# Patient Record
Sex: MALE | Race: WHITE | Employment: PART TIME | URBAN - METROPOLITAN AREA
[De-identification: names, ages, dates, MRNs, and addresses within clinical notes are randomized per-mention and may not be internally consistent; named-entity substitution may affect disease eponyms.]

---

## 2018-08-04 PROCEDURE — 82570 ASSAY OF URINE CREATININE: CPT | Performed by: INTERNAL MEDICINE

## 2018-08-04 PROCEDURE — 82043 UR ALBUMIN QUANTITATIVE: CPT | Performed by: INTERNAL MEDICINE

## 2022-04-06 PROBLEM — R40.0 DAYTIME SOMNOLENCE: Status: ACTIVE | Noted: 2022-04-06

## 2022-04-06 PROBLEM — R35.1 NOCTURIA: Status: ACTIVE | Noted: 2022-04-06

## 2022-04-06 PROBLEM — I70.0 AORTIC ATHEROSCLEROSIS (HCC): Status: ACTIVE | Noted: 2022-04-06

## 2022-04-06 PROBLEM — I70.0 AORTIC ATHEROSCLEROSIS: Status: ACTIVE | Noted: 2022-04-06

## 2022-04-06 PROBLEM — R06.83 SNORING: Status: ACTIVE | Noted: 2022-04-06

## 2023-12-27 ENCOUNTER — OFFICE VISIT (OUTPATIENT)
Dept: WOUND CARE | Facility: HOSPITAL | Age: 68
End: 2023-12-27
Attending: NURSE PRACTITIONER
Payer: MEDICARE

## 2023-12-27 VITALS
HEART RATE: 122 BPM | SYSTOLIC BLOOD PRESSURE: 129 MMHG | DIASTOLIC BLOOD PRESSURE: 74 MMHG | TEMPERATURE: 97 F | BODY MASS INDEX: 43.95 KG/M2 | HEIGHT: 68 IN | OXYGEN SATURATION: 94 % | WEIGHT: 290 LBS | RESPIRATION RATE: 18 BRPM

## 2023-12-27 DIAGNOSIS — I87.2 VENOUS INSUFFICIENCY OF BOTH LOWER EXTREMITIES: ICD-10-CM

## 2023-12-27 DIAGNOSIS — S81.802A TRAUMATIC OPEN WOUND OF LEFT LOWER LEG, INITIAL ENCOUNTER: Primary | ICD-10-CM

## 2023-12-27 PROCEDURE — 99214 OFFICE O/P EST MOD 30 MIN: CPT | Performed by: NURSE PRACTITIONER

## 2023-12-27 NOTE — PATIENT INSTRUCTIONS
[facility]: [phone]    Patient Instructions: You may resume normal activity unless otherwise advised by your provider  Do not get the compression bandage wet. It must all be removed if any part of it gets wet. Call the clinic if this happens. A cast cover can be purchased from your pharmacy, [employer] or a local medical supply store (example:  [address] [employer])  Lie down 2-3 times a day and elevate your legs above the level of your heart for 30 minutes  Check your toes at least 3 times a day for proper circulation, sensation, and motion  Note any swelling to the foot or toes, compare to the opposite foot  Note color and temperature of toes, should be pink and warm  Bend and extend your toes  Monitor for any new numbness or tingling in toes or foot    PROMPTLY REPORT THE FOLLOWING SYMPTOMS TO THE DOCTOR/APN OR CLINIC:   Increase in pain or pressure in the leg(s)  Drainage and/or odor from the leg(s)  Excessive swelling in the toes or leg(s)  Sore or broken-down skin around the dressing  Changes in skin color or temperature  Tingling or numbness of leg(s)  Decrease in movement or loss of feeling in leg(s)

## 2024-01-03 ENCOUNTER — OFFICE VISIT (OUTPATIENT)
Dept: WOUND CARE | Facility: HOSPITAL | Age: 69
End: 2024-01-03
Attending: NURSE PRACTITIONER
Payer: MEDICARE

## 2024-01-03 VITALS
SYSTOLIC BLOOD PRESSURE: 139 MMHG | OXYGEN SATURATION: 96 % | RESPIRATION RATE: 18 BRPM | TEMPERATURE: 98 F | DIASTOLIC BLOOD PRESSURE: 80 MMHG | HEART RATE: 93 BPM

## 2024-01-03 DIAGNOSIS — I87.2 VENOUS INSUFFICIENCY OF BOTH LOWER EXTREMITIES: ICD-10-CM

## 2024-01-03 DIAGNOSIS — S81.801A OPEN WOUND OF RIGHT LOWER EXTREMITY, INITIAL ENCOUNTER: ICD-10-CM

## 2024-01-03 DIAGNOSIS — S81.802D TRAUMATIC OPEN WOUND OF LEFT LOWER LEG, SUBSEQUENT ENCOUNTER: Primary | ICD-10-CM

## 2024-01-03 PROCEDURE — 99213 OFFICE O/P EST LOW 20 MIN: CPT | Performed by: NURSE PRACTITIONER

## 2024-01-03 NOTE — PROGRESS NOTES
Chief Complaint   Patient presents with    Wound Recheck     L  anterior lower leg    B/S-79     HPI:   1/3/2024: Here today for a follow up visit. Reports he tolerated the wraps well. Denies changes in health since last visit. No new concerns today.     12/27/23: 68 yr old male with PMH of T2DM, HLD, HTN, obesity, hx of DVT on xarelto, and venous insufficiency. He is here today for evaluation of a wound on his left lower leg. Hit his leg with  door 11/26/23. Was evaluated at ED and given Rx for bacitracin. Has been receiving wound care through his PCP office. Most recently seen 12/20/23. Per RN notes, petroleum gauze and PolyMem was applied along with compression to b/l lower extremities.  Pt reports he occasionally wears compression socks when wounds are not present, but finds it difficult to pull and sometimes rubs on leg causing blisters.     Outside Labs:  12/13/23  BUN 17  Cr 1.05  GFR 72  Glc 87  Hgb 14.1  Hematocrit 43     10/14/23  A1c 6.7%    Lab Results   Component Value Date    BUN 21.0 (H) 01/05/2022    CREATSERUM 0.99 01/05/2022    GFRCKDEPI 79.04 01/05/2022    ALB 4.3 01/05/2022    TP 7.1 01/05/2022    A1C 7.6 (H) 04/02/2022     Current Outpatient Medications:     Insulin Pen Needle (BD PEN NEEDLE SHORT U/F) 31G X 8 MM Does not apply Misc, Use once daily with insulin pen; 100 needles per box please, Disp: 100 each, Rfl: 3    rivaroxaban (XARELTO) 20 MG Oral Tab, Take 1 tablet (20 mg total) by mouth daily with food., Disp: 90 tablet, Rfl: 2    Insulin Pen Needle (BD PEN NEEDLE SHORT U/F) 31G X 8 MM Does not apply Misc, USE ONCE DAILY WITH INSULIN, Disp: 100 each, Rfl: 2    Glucose Blood (ONETOUCH TEST) In Vitro Strip, 1 each by Other route 2 (two) times daily., Disp: 200 each, Rfl: 1    pioglitazone 45 MG Oral Tab, Take 1 tablet (45 mg total) by mouth daily., Disp: 30 tablet, Rfl: 1    metFORMIN HCl 1000 MG Oral Tab, Take 1 tablet (1,000 mg total) by mouth 2 (two) times daily with meals.,  Disp: 60 tablet, Rfl: 1    Irbesartan 150 MG Oral Tab, Take 1 tablet (150 mg total) by mouth daily., Disp: 30 tablet, Rfl: 1    Insulin Glargine-Lixisenatide (SOLIQUA) 100-33 UNT-MCG/ML Subcutaneous Solution Pen-injector, Inject 30 Units into the skin every morning., Disp: 9 mL, Rfl: 1    hydroCHLOROthiazide 12.5 MG Oral Cap, Take 1 capsule (12.5 mg total) by mouth once daily., Disp: 30 capsule, Rfl: 1    glimepiride 4 MG Oral Tab, Take 2 tablets (8 mg total) by mouth daily., Disp: 60 tablet, Rfl: 1    atorvastatin (LIPITOR) 40 MG Oral Tab, Take 1 tablet (40 mg total) by mouth daily., Disp: 30 tablet, Rfl: 1    ONETOUCH LANCETS Does not apply Misc, 1 each by Finger stick route 2 (two) times daily., Disp: 200 each, Rfl: 1    Allergies   Allergen Reactions    Cerave Itch Relief [Pramoxine Hcl]       REVIEW OF SYSTEMS:   Review of Systems   Constitutional:  Negative for activity change and fever.   Respiratory:  Positive for shortness of breath (chronic).    Cardiovascular:  Positive for leg swelling. Negative for chest pain.   Gastrointestinal:  Negative for abdominal pain, diarrhea and vomiting.   Skin:  Positive for wound. Negative for rash.   Neurological:  Positive for dizziness (Early AM, PCP working up symptom). Negative for headaches.     PHYSICAL EXAM:   /80   Pulse 93   Temp 98.3 °F (36.8 °C) (Temporal)   Resp 18   SpO2 96%    Estimated body mass index is 44.09 kg/m² as calculated from the following:    Height as of 12/27/23: 68\".    Weight as of 12/27/23: 290 lb.   Vital signs reviewed.Appears stated age, well groomed.    Physical Exam   Constitutional:       Appearance: Normal appearance. He is obese.   Cardiovascular:      Pulses:           Dorsalis pedis pulses are 2+ on the right side and 2+ on the left side.        Posterior tibial pulses are detected w/ Doppler on the right side and detected w/ Doppler on the left side.      Comments: B/l DP, PT pulses with biphasic wave sounds using handheld  doppler  Pulmonary:      Effort: Pulmonary effort is normal.   Musculoskeletal:      Cervical back: Neck supple.      Right lower leg: Edema present.      Left lower leg: Edema present.   Skin:     General: Skin is warm and dry.      Capillary Refill: Capillary refill takes 2 to 3 seconds.      Findings: Wound present.      Comments: Left lower leg wound. Varicose veins and hemosiderin staining of b/l LEs   Neurological:      General: No focal deficit present.      Mental Status: He is alert and oriented to person, place, and time.   Psychiatric:         Mood and Affect: Mood normal.         Behavior: Behavior normal.         Thought Content: Thought content normal.         Judgment: Judgment normal.      Calf  Point of Measurement - Left Calf: 33  Point of Measurement - Right Calf: 33  Left Calf from:: Heel  Calf Left cm:: 49.5  Right Calf from:: Heel  Right Calf cm:: 52     Ankle  Point of Measurement - Left Ankle: 10  Point of Measurement - Right Ankle: 10  Left Ankle from:: Heel  Left Ankle cm:: 29.5  Right Ankle from:: Heel  Right Ankle cm:: 31     Foot  Point of Measurement - Left Foot: 10  Left Foot from:: Great toe  Left Foot cm:: 26  Left Heel to Knee: 40  Point of Measurement - Right Foot: 10  Right Foot from:: Great toe  Right Foot cm:: 25  Right Heel to Knee: 40    Wound 12/27/23 1 Leg Left;Lower;Anterior (Active)   Date First Assessed/Time First Assessed: 12/27/23 0856    Wound Number (Wound Clinic Only): 1  Primary Wound Type: Traumatic  Location: Leg  Wound Location Orientation: Left;Lower;Anterior      Assessments 12/27/2023  9:00 AM 1/3/2024 11:45 AM   Wound Image       Site Assessment Clean;Moist;Red Clean;Pink;Moist;Edema   Closure Not approximated Not approximated   Drainage Amount Moderate Small   Drainage Description Brown;Sanguineous Brown;Sanguineous   Treatments Cleansed;Compression;Wound  Cleansed;Compression;Wound    Dressing Hydrofera ready;Kerlix roll Hydrofera ready;Kerlix  roll   Dressing Changed New New   Dressing Status Dry;Intact;Clean Dry;Intact;Clean   Wound Length (cm) 0.5 cm 0.3 cm   Wound Width (cm) 0.5 cm 0.3 cm   Wound Surface Area (cm^2) 0.25 cm^2 0.09 cm^2   Wound Depth (cm) 0.1 cm 0.1 cm   Wound Volume (cm^3) 0.025 cm^3 0.009 cm^3   Wound Healing % -- 64   Margins Attached edges Attached edges   Non-staged Wound Description Partial thickness Partial thickness   Carol-wound Assessment Hemosiderin staining;Dry;Edema Hemosiderin staining;Dry;Edema   Wound Bed Granulation (%) 0 % 0 %   Wound Bed Epithelium (%) 80 % 90 %   Wound Bed Slough (%) 0 % 0 %   Wound Bed Eschar (%) 0 % 0 %   Wound Bed Fibrin (%) 0 % 0 %   State of Healing Non-healing Epithelialized   Wound Odor None None       No associated orders.       Compression Wrap 12/27/23 Tibial Left;Right (Active)   Placement Date/Time: 12/27/23 0948   Location: Tibial  Wound Location Orientation: Left;Right      Assessments 12/27/2023  9:00 AM 1/3/2024 11:45 AM   Response to Treatment Well tolerated Well tolerated   Compression Layers 2 2   Compression Product Type Comprilan 10cm;Comprilan 12cm;Stockinette 4in;Artiflex 15cm;Artiflex 10cm Comprilan 10cm;Comprilan 12cm;Stockinette 4in;Artiflex 15cm;Artiflex 10cm   Dressing Applied Yes Yes   Compression Wrap Location Toes to Knee Toes to Knee   Compression Wrap Status Clean;Intact;Dry Clean;Intact;Dry       No associated orders.       Wound 01/03/24 2 Leg Lower;Posterior;Right (Active)   Date First Assessed/Time First Assessed: 01/03/24 1143    Wound Number (Wound Clinic Only): 2  Primary Wound Type: Venous Ulcer  Location: Leg  Wound Location Orientation: Lower;Posterior;Right      Assessments 1/3/2024 11:45 AM   Wound Image     Site Assessment Bleeding;Edema;Fragile   Closure Not approximated   Drainage Amount Moderate   Drainage Description Serosanguineous;Sanguineous   Treatments Cleansed;Compression;Wound ;Topical (Barrier/Moisturizer/Ointment)   Dressing Hydrofera  ready   Dressing Changed New   Dressing Status Clean;Dry;Intact   Wound Length (cm) 1.5 cm   Wound Width (cm) 0.5 cm   Wound Surface Area (cm^2) 0.75 cm^2   Wound Depth (cm) 0.1 cm   Wound Volume (cm^3) 0.075 cm^3   Margins Flat and Intact   Non-staged Wound Description Partial thickness   Carol-wound Assessment Edema;Fragile;Hyperpigmented;Pink   Wound Bed Granulation (%) 0 %   Wound Bed Epithelium (%) 90 %   Wound Bed Slough (%) 0 %   Wound Bed Eschar (%) 0 %       No associated orders.        ASSESSMENT AND PLAN:      1. Traumatic open wound of left lower leg, subsequent encounter    2. Open wound of right lower extremity, initial encounter    3. Venous insufficiency of both lower extremities    Here today for follow up on traumatic wound of left lower leg. Injury occurred approx 5 weeks ago. New partial thickness wound on posterior right leg, possibly due to friction from compression wrap.     Wound measurements on LLE improved. Wound bed remains partial thickness. Small amount of drainage. RLE with partial thickness wound, moderate drainage. No s/sx of skin infection. Pt with known history of chronic bilateral vein disease, varicose veins and hemosiderin staining present. Bilateral DP/PT pulses with biphasic wave sounds indicating adequate blood flow to heal wound. Hindering factors for wound healing include HTN, T2DM, chonic vein disease and BMI >40.     Treatment Plan:  -Hydrofera to wounds b/l  -Continue 2 layers of Comprilan for compression  -RTC 1 week for dressing change     F/u on order for velcro compression next week  Dressing and compression to be changed in 1 week. Pt was advised to keep in place and avoid getting wrap wet. Recommended cast cover if he plans to shower. Reviewed compression wrap instructions and provided handout.  Pt to continue to monitor for signs and symptoms of infection, encourage patient to assess skin daily.  Continue to follow nutrition for wound healing and encourage reduce  carbohydrate intake, increase protein intake, and low calorie diet. Recommended healthy diet with increase vitamin intake (either with foods or vitamin supplements), need vitamin A, C, D and zinc for wound healing.   Risks, benefits, and alternatives of current treatment plan discussed in detail.  Questions and concerns addressed. Red flags to RTC or ED reviewed.  Patient agrees to plan.       Return in about 1 week (around 1/10/2024) for APN visit for 30 min.     I spent 30 minutes with the patient. This time included:  preparing to see the patient (eg, review notes and recent diagnostics), seeing the patient, performing a medically appropriate examination and/or evaluation, counseling and educating the patient, and documenting in the record.     NOTE TO PATIENT: The 21st Century Cures Act makes clinical notes like these available to patients in the interest of transparency. Clinical notes are medical documents used by physicians and care providers to communicate with each other. These documents include medical language and terminology, abbreviations, and treatment information that may sound technical and at times possibly unfamiliar. In addition, at times, the verbiage may appear blunt or direct. These documents are one tool providers use to communicate relevant information and clinical opinions of the care providers in a way that allows common understanding of the clinical context.

## 2024-01-10 ENCOUNTER — OFFICE VISIT (OUTPATIENT)
Dept: WOUND CARE | Facility: HOSPITAL | Age: 69
End: 2024-01-10
Attending: NURSE PRACTITIONER
Payer: MEDICARE

## 2024-01-10 VITALS
RESPIRATION RATE: 20 BRPM | TEMPERATURE: 98 F | DIASTOLIC BLOOD PRESSURE: 77 MMHG | OXYGEN SATURATION: 96 % | SYSTOLIC BLOOD PRESSURE: 130 MMHG | HEART RATE: 100 BPM

## 2024-01-10 DIAGNOSIS — S81.802D TRAUMATIC OPEN WOUND OF LEFT LOWER LEG, SUBSEQUENT ENCOUNTER: ICD-10-CM

## 2024-01-10 DIAGNOSIS — S81.801D OPEN WOUND OF RIGHT LOWER EXTREMITY WITH COMPLICATION, SUBSEQUENT ENCOUNTER: Primary | ICD-10-CM

## 2024-01-10 DIAGNOSIS — I87.2 VENOUS INSUFFICIENCY OF BOTH LOWER EXTREMITIES: ICD-10-CM

## 2024-01-10 PROCEDURE — 99213 OFFICE O/P EST LOW 20 MIN: CPT | Performed by: NURSE PRACTITIONER

## 2024-01-10 NOTE — PATIENT INSTRUCTIONS
MULTI LAYER COMPRESSION BANDAGE  [facility]: [phone]  Patient Instructions:   You may resume normal activity unless otherwise advised by your provider  Do not get the compression bandage wet. It must all be removed if any part of it gets wet. Call the clinic if this happens.  A cast cover can be purchased from your pharmacy, [employer] or a local medical supply store (example: [employer])  Lie down 2-3 times a day and elevate your legs above the level of your heart for 30 minutes if tolerated  Check your toes at least 3 times a day for proper circulation, sensation, and motion  Note any swelling to the foot or toes, compare to the opposite foot  Note color and temperature of toes, should be pink and warm  Bend and extend your toes  Monitor for any new numbness or tingling in toes or foot  PROMPTLY REPORT THE FOLLOWING SYMPTOMS TO THE DOCTOR/APN OR CLINIC:   Increase in pain or pressure in the leg(s)  Drainage and/or odor from the leg(s)  Excessive swelling in the toes or leg(s)  Sore or broken-down skin around the dressing  Changes in skin color or temperature  Tingling or numbness of leg(s)  Decrease in movement or loss of feeling in leg(s)

## 2024-01-10 NOTE — PROGRESS NOTES
Chief Complaint   Patient presents with    Wound Recheck     Bilateral legs, Pt Bs are at 148     HPI:   1/10/24: Here today for a follow up visit. Continues to tolerate the compression wraps well. Reports job is sedentary and very busy which makes it difficult to set time to get up for movement regularly during the day. Tries to point/flex toes regularly. Will elevate one leg at a time when he can as well, up to 30 minutes.     1/3/2024: Here today for a follow up visit. Reports he tolerated the wraps well. Denies changes in health since last visit. No new concerns today.      12/27/23: 68 yr old male with PMH of T2DM, HLD, HTN, obesity, hx of DVT on xarelto, and venous insufficiency. He is here today for evaluation of a wound on his left lower leg. Hit his leg with  door 11/26/23. Was evaluated at ED and given Rx for bacitracin. Has been receiving wound care through his PCP office. Most recently seen 12/20/23. Per RN notes, petroleum gauze and PolyMem was applied along with compression to b/l lower extremities.  Pt reports he occasionally wears compression socks when wounds are not present, but finds it difficult to pull and sometimes rubs on leg causing blisters.      Outside Labs:  12/13/23  BUN 17  Cr 1.05  GFR 72  Glc 87  Hgb 14.1  Hematocrit 43     10/14/23  A1c 6.7%    Lab Results   Component Value Date    BUN 21.0 (H) 01/05/2022    CREATSERUM 0.99 01/05/2022    GFRCKDEPI 79.04 01/05/2022    ALB 4.3 01/05/2022    TP 7.1 01/05/2022    A1C 7.6 (H) 04/02/2022     Current Outpatient Medications:     Insulin Pen Needle (BD PEN NEEDLE SHORT U/F) 31G X 8 MM Does not apply Misc, Use once daily with insulin pen; 100 needles per box please, Disp: 100 each, Rfl: 3    rivaroxaban (XARELTO) 20 MG Oral Tab, Take 1 tablet (20 mg total) by mouth daily with food., Disp: 90 tablet, Rfl: 2    Insulin Pen Needle (BD PEN NEEDLE SHORT U/F) 31G X 8 MM Does not apply Misc, USE ONCE DAILY WITH INSULIN, Disp: 100 each, Rfl:  2    Glucose Blood (ONETOUCH TEST) In Vitro Strip, 1 each by Other route 2 (two) times daily., Disp: 200 each, Rfl: 1    pioglitazone 45 MG Oral Tab, Take 1 tablet (45 mg total) by mouth daily., Disp: 30 tablet, Rfl: 1    metFORMIN HCl 1000 MG Oral Tab, Take 1 tablet (1,000 mg total) by mouth 2 (two) times daily with meals., Disp: 60 tablet, Rfl: 1    Irbesartan 150 MG Oral Tab, Take 1 tablet (150 mg total) by mouth daily., Disp: 30 tablet, Rfl: 1    Insulin Glargine-Lixisenatide (SOLIQUA) 100-33 UNT-MCG/ML Subcutaneous Solution Pen-injector, Inject 30 Units into the skin every morning., Disp: 9 mL, Rfl: 1    hydroCHLOROthiazide 12.5 MG Oral Cap, Take 1 capsule (12.5 mg total) by mouth once daily., Disp: 30 capsule, Rfl: 1    glimepiride 4 MG Oral Tab, Take 2 tablets (8 mg total) by mouth daily., Disp: 60 tablet, Rfl: 1    atorvastatin (LIPITOR) 40 MG Oral Tab, Take 1 tablet (40 mg total) by mouth daily., Disp: 30 tablet, Rfl: 1    ONETOUCH LANCETS Does not apply Misc, 1 each by Finger stick route 2 (two) times daily., Disp: 200 each, Rfl: 1    Allergies   Allergen Reactions    Cerave Itch Relief [Pramoxine Hcl]       REVIEW OF SYSTEMS:   Review of Systems   Review of Systems (ROS)  Constitutional:  Negative for activity change and fever.   Respiratory:  Positive for shortness of breath (chronic).    Cardiovascular:  Positive for leg swelling. Negative for chest pain.   Gastrointestinal:  Negative for abdominal pain, diarrhea and vomiting.   Skin:  Positive for wound. Negative for rash.   Neurological:  Positive for dizziness (Early AM, PCP working up symptom).Negative for headaches.      PHYSICAL EXAM:   /77   Pulse 100   Temp 97.7 °F (36.5 °C)   Resp 20   SpO2 96%    Estimated body mass index is 44.09 kg/m² as calculated from the following:    Height as of 12/27/23: 68\".    Weight as of 12/27/23: 290 lb.   Vital signs reviewed.Appears stated age, well groomed.    Physical Exam   Constitutional:        Appearance: Normal appearance. He is obese.   Cardiovascular:      Pulses:           Dorsalis pedis pulses are 2+ on the right side and 2+ on the left side.        Posterior tibial pulses are detected w/ Doppler on the right side and detected w/ Doppler on the left side.      Comments: B/l DP, PT pulses with biphasic wave sounds using handheld doppler  Pulmonary:      Effort: Pulmonary effort is normal.   Musculoskeletal:      Cervical back: Neck supple.      Right lower leg: Edema present.      Left lower leg: Edema present.   Skin:     General: Skin is warm and dry.      Capillary Refill: Capillary refill takes 2 to 3 seconds.      Findings: Wound present.      Comments: Varicose veins and hemosiderin staining of b/l LEs   Neurological:      General: No focal deficit present.      Mental Status: He is alert and oriented to person, place, and time.   Psychiatric:         Mood and Affect: Mood normal.         Behavior: Behavior normal.           Calf  Point of Measurement - Left Calf: 33  Point of Measurement - Right Calf: 33  Left Calf from:: Heel  Calf Left cm:: 49.5  Right Calf from:: Heel  Right Calf cm:: 52     Ankle  Point of Measurement - Left Ankle: 10  Point of Measurement - Right Ankle: 10  Left Ankle from:: Heel  Left Ankle cm:: 29.5  Right Ankle from:: Heel  Right Ankle cm:: 31     Foot  Point of Measurement - Left Foot: 10  Left Foot from:: Great toe  Left Foot cm:: 26  Left Heel to Knee: 40  Point of Measurement - Right Foot: 10  Right Foot from:: Great toe  Right Foot cm:: 25  Right Heel to Knee: 40    Wound 12/27/23 1 Leg Left;Lower;Anterior (Active)   Date First Assessed/Time First Assessed: 12/27/23 0856    Wound Number (Wound Clinic Only): 1  Primary Wound Type: Traumatic  Location: Leg  Wound Location Orientation: Left;Lower;Anterior      Assessments 12/27/2023  9:00 AM 1/10/2024  9:39 AM   Wound Image       Site Assessment Clean;Moist;Red Clean;Epithelialization;Pink;Dry;Intact   Closure Not  approximated Approximated   Drainage Amount Moderate None   Drainage Description Brown;Sanguineous --   Treatments Cleansed;Compression;Wound  Cleansed;Compression;Topical (Barrier/Moisturizer/Ointment)   Dressing Hydrofera ready;Kerlix roll --   Dressing Changed New --   Dressing Status Dry;Intact;Clean --   Wound Length (cm) 0.5 cm 0 cm   Wound Width (cm) 0.5 cm 0 cm   Wound Surface Area (cm^2) 0.25 cm^2 0 cm^2   Wound Depth (cm) 0.1 cm 0 cm   Wound Volume (cm^3) 0.025 cm^3 0 cm^3   Wound Healing % -- 100   Margins Attached edges --   Non-staged Wound Description Partial thickness Not applicable   Carol-wound Assessment Hemosiderin staining;Dry;Edema Hemosiderin staining;Dry;Edema;Clean;Intact   Wound Bed Granulation (%) 0 % --   Wound Bed Epithelium (%) 80 % 100 %   Wound Bed Slough (%) 0 % --   Wound Bed Eschar (%) 0 % --   Wound Bed Fibrin (%) 0 % 0 %   State of Healing Non-healing Epithelialized   Wound Odor None None       No associated orders.       Compression Wrap 12/27/23 Tibial Left;Right (Active)   Placement Date/Time: 12/27/23 0948   Location: Tibial  Wound Location Orientation: Left;Right      Assessments 12/27/2023  9:00 AM 1/10/2024  9:39 AM   Response to Treatment Well tolerated Well tolerated   Compression Layers 2 2   Compression Product Type Comprilan 10cm;Comprilan 12cm;Stockinette 4in;Artiflex 15cm;Artiflex 10cm Comprilan 10cm;Comprilan 12cm;Stockinette 4in;Artiflex 15cm;Artiflex 10cm;Tubigrip/Spanda   Dressing Applied Yes Yes   Compression Wrap Location Toes to Knee Toes to Knee   Compression Wrap Status Clean;Intact;Dry Clean;Intact;Dry       No associated orders.       Wound 01/03/24 2 Leg Lower;Posterior;Right (Active)   Date First Assessed/Time First Assessed: 01/03/24 1143    Wound Number (Wound Clinic Only): 2  Primary Wound Type: Venous Ulcer  Location: Leg  Wound Location Orientation: Lower;Posterior;Right      Assessments 1/3/2024 11:45 AM 1/10/2024  9:39 AM   Wound Image        Site Assessment Bleeding;Edema;Fragile Edema;Red;Clean   Closure Not approximated Not approximated   Drainage Amount Moderate Small   Drainage Description Serosanguineous;Sanguineous Sanguineous   Treatments Cleansed;Compression;Wound ;Topical (Barrier/Moisturizer/Ointment) Cleansed;Compression;Wound ;Topical (Barrier/Moisturizer/Ointment)   Dressing Hydrofera ready Hydrofera ready;Kerlix roll   Dressing Changed New New   Dressing Status Clean;Dry;Intact Clean;Dry;Intact   Wound Length (cm) 1.5 cm 0.6 cm   Wound Width (cm) 0.5 cm 0.5 cm   Wound Surface Area (cm^2) 0.75 cm^2 0.3 cm^2   Wound Depth (cm) 0.1 cm 0.1 cm   Wound Volume (cm^3) 0.075 cm^3 0.03 cm^3   Wound Healing % -- 60   Margins Flat and Intact Flat and Intact   Non-staged Wound Description Partial thickness Partial thickness   Carol-wound Assessment Edema;Fragile;Hyperpigmented;Pink Edema;Fragile;Hyperpigmented;Pink   Wound Bed Granulation (%) 0 % 0 %   Wound Bed Epithelium (%) 90 % 95 %   Wound Bed Slough (%) 0 % 0 %   Wound Bed Eschar (%) 0 % 0 %       No associated orders.      ASSESSMENT AND PLAN:      1. Open wound of right lower extremity with complication, subsequent encounter    2. Traumatic open wound of left lower leg, subsequent encounter    3. Venous insufficiency of both lower extremities    Here today for follow up on traumatic wound of left lower leg. Injury occurred approx 6 weeks ago. Partial thickness wound on posterior right leg. LLE wound has closed. RLE wound measurements improved. Small amount of drainage present. No s/sx of skin infection. Pt with known history of chronic bilateral vein disease--varicose veins and hemosiderin staining present. Bilateral DP/PT pulses with biphasic wave sounds indicating adequate blood flow to heal wounds were present at initial visit. Hindering factors for wound healing include HTN, T2DM, chonic vein disease, sedentary occupation and BMI >40.      Treatment Plan:  -RLE wound  Hydrofera blue to wound  -Continue 2 layers of Comprilan for compression on b/l LEs  -RTC 1 week for dressing change     Dressing and compression to be changed in 1 week. Pt was advised to keep in place and avoid getting wrap wet. Recommended cast cover if he plans to shower. Reviewed compression wrap instructions and provided handout.  Pt to continue to monitor for signs and symptoms of infection, encourage patient to assess skin daily.  Continue to follow nutrition for wound healing and encourage reduce carbohydrate intake, increase protein intake, and low calorie diet. Recommended healthy diet with increase vitamin intake (either with foods or vitamin supplements), need vitamin A, C, D and zinc for wound healing.   Risks, benefits, and alternatives of current treatment plan discussed in detail.  Questions and concerns addressed. Red flags to RTC or ED reviewed.  Patient agrees to plan.       Return in about 1 week (around 1/17/2024) for APN visit for 30 min.     I spent 24 minutes with the patient. This time included:  preparing to see the patient (eg, review notes and recent diagnostics), seeing the patient, performing a medically appropriate examination and/or evaluation, counseling and educating the patient, and documenting in the record.     NOTE TO PATIENT: The 21st Century Cures Act makes clinical notes like these available to patients in the interest of transparency. Clinical notes are medical documents used by physicians and care providers to communicate with each other. These documents include medical language and terminology, abbreviations, and treatment information that may sound technical and at times possibly unfamiliar. In addition, at times, the verbiage may appear blunt or direct. These documents are one tool providers use to communicate relevant information and clinical opinions of the care providers in a way that allows common understanding of the clinical context.

## 2024-01-17 ENCOUNTER — OFFICE VISIT (OUTPATIENT)
Dept: WOUND CARE | Facility: HOSPITAL | Age: 69
End: 2024-01-17
Attending: NURSE PRACTITIONER
Payer: MEDICARE

## 2024-01-17 VITALS
RESPIRATION RATE: 17 BRPM | DIASTOLIC BLOOD PRESSURE: 82 MMHG | TEMPERATURE: 97 F | SYSTOLIC BLOOD PRESSURE: 151 MMHG | HEART RATE: 90 BPM | OXYGEN SATURATION: 98 %

## 2024-01-17 DIAGNOSIS — S81.801D OPEN WOUND OF RIGHT LOWER EXTREMITY, SUBSEQUENT ENCOUNTER: Primary | ICD-10-CM

## 2024-01-17 DIAGNOSIS — Z87.828 HEALED WOUND: ICD-10-CM

## 2024-01-17 DIAGNOSIS — I87.2 VENOUS INSUFFICIENCY OF BOTH LOWER EXTREMITIES: ICD-10-CM

## 2024-01-17 PROCEDURE — 99213 OFFICE O/P EST LOW 20 MIN: CPT | Performed by: NURSE PRACTITIONER

## 2024-01-24 ENCOUNTER — OFFICE VISIT (OUTPATIENT)
Dept: WOUND CARE | Facility: HOSPITAL | Age: 69
End: 2024-01-24
Attending: NURSE PRACTITIONER
Payer: MEDICARE

## 2024-01-24 VITALS
TEMPERATURE: 97 F | HEART RATE: 65 BPM | RESPIRATION RATE: 17 BRPM | SYSTOLIC BLOOD PRESSURE: 132 MMHG | OXYGEN SATURATION: 96 % | DIASTOLIC BLOOD PRESSURE: 67 MMHG

## 2024-01-24 DIAGNOSIS — Z87.828 HEALED WOUND: ICD-10-CM

## 2024-01-24 DIAGNOSIS — I87.2 VENOUS INSUFFICIENCY OF BOTH LOWER EXTREMITIES: ICD-10-CM

## 2024-01-24 DIAGNOSIS — S81.801D OPEN WOUND OF RIGHT LOWER EXTREMITY, SUBSEQUENT ENCOUNTER: Primary | ICD-10-CM

## 2024-01-24 PROCEDURE — 99212 OFFICE O/P EST SF 10 MIN: CPT | Performed by: NURSE PRACTITIONER

## 2024-01-24 NOTE — PROGRESS NOTES
Chief Complaint   Patient presents with    Wound Recheck     Bilateral leg wounds; B/S this AM 77.  Patient has compression wraps in place and does not change them.    Toenail Care     HPI:   1/24/24: Here today for a follow up. No new concerns.     1/17/24: Here today for a follow up. Tolerating wraps well. No new concerns or changes in health since last visit.     1/10/24: Here today for a follow up visit. Continues to tolerate the compression wraps well. Reports job is sedentary and very busy which makes it difficult to set time to get up for movement regularly during the day. Tries to point/flex toes regularly. Will elevate one leg at a time when he can as well, up to 30 minutes.      1/3/2024: Here today for a follow up visit. Reports he tolerated the wraps well. Denies changes in health since last visit. No new concerns today.      12/27/23: 68 yr old male with PMH of T2DM, HLD, HTN, obesity, hx of DVT on xarelto, and venous insufficiency. He is here today for evaluation of a wound on his left lower leg. Hit his leg with  door 11/26/23. Was evaluated at ED and given Rx for bacitracin. Has been receiving wound care through his PCP office. Most recently seen 12/20/23. Per RN notes, petroleum gauze and PolyMem was applied along with compression to b/l lower extremities.  Pt reports he occasionally wears compression socks when wounds are not present, but finds it difficult to pull and sometimes rubs on leg causing blisters.      Outside Labs:  12/13/23  BUN 17  Cr 1.05  GFR 72  Glc 87  Hgb 14.1  Hematocrit 43     10/14/23  A1c 6.7%  Lab Results   Component Value Date    BUN 21.0 (H) 01/05/2022    CREATSERUM 0.99 01/05/2022    GFRCKDEPI 79.04 01/05/2022    ALB 4.3 01/05/2022    TP 7.1 01/05/2022    A1C 7.6 (H) 04/02/2022     Current Outpatient Medications:     Insulin Pen Needle (BD PEN NEEDLE SHORT U/F) 31G X 8 MM Does not apply Misc, Use once daily with insulin pen; 100 needles per box please, Disp: 100  each, Rfl: 3    rivaroxaban (XARELTO) 20 MG Oral Tab, Take 1 tablet (20 mg total) by mouth daily with food., Disp: 90 tablet, Rfl: 2    Insulin Pen Needle (BD PEN NEEDLE SHORT U/F) 31G X 8 MM Does not apply Misc, USE ONCE DAILY WITH INSULIN, Disp: 100 each, Rfl: 2    Glucose Blood (ONETOUCH TEST) In Vitro Strip, 1 each by Other route 2 (two) times daily., Disp: 200 each, Rfl: 1    pioglitazone 45 MG Oral Tab, Take 1 tablet (45 mg total) by mouth daily., Disp: 30 tablet, Rfl: 1    metFORMIN HCl 1000 MG Oral Tab, Take 1 tablet (1,000 mg total) by mouth 2 (two) times daily with meals., Disp: 60 tablet, Rfl: 1    Irbesartan 150 MG Oral Tab, Take 1 tablet (150 mg total) by mouth daily., Disp: 30 tablet, Rfl: 1    Insulin Glargine-Lixisenatide (SOLIQUA) 100-33 UNT-MCG/ML Subcutaneous Solution Pen-injector, Inject 30 Units into the skin every morning., Disp: 9 mL, Rfl: 1    hydroCHLOROthiazide 12.5 MG Oral Cap, Take 1 capsule (12.5 mg total) by mouth once daily., Disp: 30 capsule, Rfl: 1    glimepiride 4 MG Oral Tab, Take 2 tablets (8 mg total) by mouth daily., Disp: 60 tablet, Rfl: 1    atorvastatin (LIPITOR) 40 MG Oral Tab, Take 1 tablet (40 mg total) by mouth daily., Disp: 30 tablet, Rfl: 1    ONETOUCH LANCETS Does not apply Misc, 1 each by Finger stick route 2 (two) times daily., Disp: 200 each, Rfl: 1    Allergies   Allergen Reactions    Cerave Itch Relief [Pramoxine Hcl]       REVIEW OF SYSTEMS:   Review of Systems   Constitutional:  Negative for activity change and fever.   Respiratory:  Positive for shortness of breath (chronic).    Cardiovascular:  Positive for leg swelling. Negative for chest pain.   Gastrointestinal:  Negative for abdominal pain, diarrhea and vomiting.   Skin:  Positive for wound. Negative for rash.   Neurological:  Positive for dizziness (Early AM, PCP working up symptom).Negative for headaches.    PHYSICAL EXAM:   /67   Pulse 65   Temp 96.8 °F (36 °C)   Resp 17   SpO2 96%     Estimated body mass index is 44.09 kg/m² as calculated from the following:    Height as of 12/27/23: 68\".    Weight as of 12/27/23: 290 lb.   Vital signs reviewed.Appears stated age, well groomed.    Physical Exam   Constitutional:       Appearance: Normal appearance. He is obese.   Pulmonary:      Effort: Pulmonary effort is normal.   Musculoskeletal:      Cervical back: Neck supple.      Right lower leg: Edema present.      Left lower leg: Edema present.   Skin:     General: Skin is warm and dry.      Capillary Refill: Capillary refill takes 2 to 3 seconds.      Comments: Varicose veins and hemosiderin staining of b/l LEs   Neurological:      General: No focal deficit present.      Mental Status: He is alert and oriented to person, place, and time.   Psychiatric:         Mood and Affect: Mood normal.         Behavior: Behavior normal.        ASSESSMENT AND PLAN:      1. Open wound of right lower extremity, subsequent encounter  - OP Wound Dressing    2. Healed wound    3. Venous insufficiency of both lower extremities    Here today for follow up visit. Pt with known history of chronic bilateral vein disease--varicose veins and hemosiderin staining present. Bilateral DP/PT pulses with biphasic wave sounds indicating adequate blood flow to heal wounds were present at initial visit. Hindering factors for wound healing include HTN, T2DM, chonic vein disease, sedentary occupation and BMI >40.      Wounds on both legs have remained closed/healed. The new skin is fragile and at risk for re-opening, therefore, legs were re-wrapped last week with 2 layers of Comprilan to prevent wounds from re-opening. He brought his velcro compression garments with today and they were applied. Pt to continue to wear compression daily along with keeping legs moisturized. Discussed importance of continuing to elevate legs 2-3 times daily above heart level as tolerated for up to 30 minutes. Also, encouraged calf exercises to help improve  blood flow as well.     I spent 18 minutes with the patient. This time included:  preparing to see the patient (eg, review notes and recent diagnostics), seeing the patient, performing a medically appropriate examination and/or evaluation, counseling and educating the patient, and documenting in the record.     NOTE TO PATIENT: The 21st Century Cures Act makes clinical notes like these available to patients in the interest of transparency. Clinical notes are medical documents used by physicians and care providers to communicate with each other. These documents include medical language and terminology, abbreviations, and treatment information that may sound technical and at times possibly unfamiliar. In addition, at times, the verbiage may appear blunt or direct. These documents are one tool providers use to communicate relevant information and clinical opinions of the care providers in a way that allows common understanding of the clinical context

## 2024-01-31 ENCOUNTER — APPOINTMENT (OUTPATIENT)
Dept: WOUND CARE | Facility: HOSPITAL | Age: 69
End: 2024-01-31
Attending: NURSE PRACTITIONER
Payer: MEDICARE

## 2025-06-25 RX ORDER — SEMAGLUTIDE 2.68 MG/ML
2 INJECTION, SOLUTION SUBCUTANEOUS WEEKLY
COMMUNITY
Start: 2025-02-24

## 2025-06-25 RX ORDER — INSULIN GLARGINE 100 [IU]/ML
35 INJECTION, SOLUTION SUBCUTANEOUS DAILY
COMMUNITY
Start: 2025-02-24

## 2025-06-25 RX ORDER — FERROUS SULFATE 325(65) MG
325 TABLET ORAL
COMMUNITY
Start: 2025-05-28

## 2025-06-25 RX ORDER — PANTOPRAZOLE SODIUM 40 MG/1
40 TABLET, DELAYED RELEASE ORAL
COMMUNITY
Start: 2025-05-27

## 2025-06-29 NOTE — OPERATIVE REPORT
PATIENT NAME: Benjamin Duffy  MRN: [id number]  DATE OF OPERATION:  7/2/25  REFERRING PHYSICIAN: Dr. Dupont  Medications:  [facility] [place] [facility]  DATE OF LAST COLONOSCOPY:  2582  PREOPERATIVE DIAGNOSIS:   Iron deficiency anemia   Upper abdominal pain  POSTOPERATIVE DIAGNOSIS:  2 - 3 cm hiatal hernia.  Normal stomach.  Biopsies were done to assess for h pylori infection.   Normal duodenum.  Biopsies were done to assess for celiac disease.   Diverticulosis were scattered throughout the colon.  7 mm sigmoid colon removed via cold snare.     PROCEDURE PERFORMED:               Esophagogastroduodenoscopy with biopsies               Colonoscopy with cold snare polypectomy     Endoscopist: Ramirez Higuera MD     PROCEDURE AND FINDINGS: The patient was placed into the left lateral decubitus after informed consent was obtained.  All questions were answered.  An updated history and physical were performed and an ASA score of 3 was assigned. Informed consent was obtained prior to the procedure, with review of possible complications including bleeding, infection, and missed polyps and or cancer.  MAC sedation was administered.        The Olympus video gastroscope was introduced into the mouth and passed into the esophagus after administration of topical oral anesthesia and MAC sedation.  The entire esophagus was examined and was remarkable for 2 - 3 cm hiatal hernia.  The esophagus was otherwise normal.  The entire examined stomach,  including retroflexion view was visualized and was remarkable for normal mucosa.  Biopsies were done to assess for h pylori infection.   The entire examined duodenum was visualized to the third portion and was remarkable for normal mucosa.  Biopsies were done to assess for celiac disease.    The gastroscope was removed from the patient.  The procedure was completed. The patient tolerated the procedure well.   The patient was turned around and a colonoscopy was performed.  The video adult colonoscope was passed from  anus to cecum.  The ileocecal valve, appendiceal orfice, hepatic and splenic flexures were all well visualized.  The bowel preparation on the Aronchick bowel prep score was 3. (1 - excellent > 95% mucosa seen; 2 - good - clear liquid up to 25% of the mucosa, 90% mucosa seen;  3 - fair - semisolid stool not suctioned, but 90% of the mucosa seen; 4 - poor - semisolid stool not suctioned, but < 90% mucosa seen; 5 - inadequate - repeat prep needed)     Findings included a 7 mm sigmoid colon polyp removed via cold snare.  Small polyps could have been missed as there was a large amount of particulate debris that could not be removed through the scope.  Vigorous effort was made to remove all retained material in the colon.  Diverticulosis were scattered throughout the colon.   On retroflexion of the scope in the anorectum, normal internal hemorrhoids were noted.     The scope withdrawal from cecum to anus was 20 minutes.    RECOMMENDATIONS      The patient will be provided with a written summary of today's endoscopic findings  The patient will be notified with biopsy results in 2 - 4 working days.   Resume xarelto tomorrow.   A video capsule small bowel endoscopy will be scheduled to assess for a small bowel source of bleeding.  Recommendations regarding repeat colonoscopy will be made once the biopsy results are reviewed.    Follow up in the office in 4 - 6 weeks.     Ramirez Higuera MD, Gastroenterologist  Cc: Dr. Cat

## 2025-06-29 NOTE — H&P
REFERRING PHYSICIAN: Dr. Dupont     HPI:  Benjamin Duffy is a 70 year old male. Consult reqested by Dr. Dupont for complaints of upper abdominal pain and iron deficiency anemia.  Pain is described as constant, episodic, lasting for hours, occurring after eating or when laying down, for a duration of 2 years. Severity is moderate to severe. Associated symptoms: sometimes nausea. The pain radiates to locally.  Pain is worsened by spicey foods. Gets relief of pain with pantoprazole 40 mg daily - started about 2 weeks ago.   Prior abdomial pain hx: - similar symptoms in the past. Appetite is normal, wgt loss: none.                        Tests done prior to this office visit: hgb 12.9, nl mcv, iron 48 with borderline low trans sat, and low ferritin 29.5, B12 250. Significant abdominal surgeries: none.   Not a vegetarian and does not donate blood.                Hx of PE 2015 - IVC filter placed, and on xarelto                On ozempic - didn't lose any weight.        PAST GI HISTORY: Negative     Prior GI endoscopies 2013 - colonoscopy      HPI:  Benjamin Duffy is a 64 year old male. Consult reqested by Dr. Ramirez for complaints of LLQ abdominal pain.  Pain is described as episodic and severe, over the last 1 month.  Pain started 1 year ago.   Walking and standing are painful.  Pain is stabbing, can last few minutes.  Last night, had 4 hour of pain.  No diarrhea or constipation.   He had the flu about 2 weeks ago, with diarrhea and vomiting every other day, with heaving and retching.                Associated symptoms: none. The pain radiates to local.  Pain is worsened by walking, sitting up or standing. Gets relief of pain with sitting upright. Prior abdomial pain hx: none. Appetite is normal, wgt loss: none.                Tests done prior to this office visit: ct abd and pelvis - djd and otherwise negative and chronically atrophic spleen. Significant abdominal surgeries: none.                No new  med other than soliqua.    Assessment:    LLQ abdominal pain                The pattern of abdominal pain is most consistent with a musculoskeletal source, with abdominal wall strain vs referred pain from lumbar spine djd.  Pain pattern correlates with movement and body position, without relation to any GI functioning.               Last colonoscopy 2013 - HP per chart.               CT abd and pelvis negative other than djd spine.     Plan:   No plans for endoscopy at present.             Consider further imaging of spine and physical therapy for back / abdominal wall pain             Medications ordered include - none, would use acetaminophen 1 gm po 2 - 3 times per day.             Follow up for colonoscopy 2023.         cc. Dr Ramirez            Electronically signed by Ramirez Higuera MD at 12/13/2019  2:10 PM     Allergy:   Cerave Itch Relief *             Current Outpatient Medications   Medication Sig Dispense Refill    Ferrous Sulfate 325 (65 Fe) MG Oral Tab Take 1 tablet (325 mg total) by mouth daily with breakfast.        pantoprazole 40 MG Oral Tab EC Take 1 tablet (40 mg total) by mouth every morning before breakfast. 30 tablet 11    BD PEN NEEDLE SHORT U/F 31G X 8 MM Does not apply Misc USE ONCE DAILY TO INJECT INSULIN 100 each 3    atorvastatin 40 MG Oral Tab Take 1 tablet (40 mg total) by mouth daily. 100 tablet 3    glimepiride 4 MG Oral Tab Take 2 tablets (8 mg total) by mouth every morning before breakfast. 200 tablet 3    insulin glargine (LANTUS SOLOSTAR) 100 UNIT/ML Subcutaneous Solution Pen-injector Inject 30 Units into the skin daily. 15 mL 3    metFORMIN HCl 1000 MG Oral Tab Take 1 tablet (1,000 mg total) by mouth 2 (two) times daily with meals. 200 tablet 3    pioglitazone 45 MG Oral Tab Take 1 tablet (45 mg total) by mouth daily. 100 tablet 3    rivaroxaban (XARELTO) 20 MG Oral Tab Take 1 tablet (20 mg total) by mouth daily with food. 100 tablet 3    Semaglutide, 2 MG/DOSE, (OZEMPIC, 2  MG/DOSE,) 8 MG/3ML Subcutaneous Solution Pen-injector Inject 2 mg into the skin once a week. 9 mL 3    Irbesartan 150 MG Oral Tab Take 1 tablet (150 mg total) by mouth daily. 90 tablet 3    hydroCHLOROthiazide 12.5 MG Oral Cap TAKE 1 CAPSULE BY MOUTH DAILY 90 capsule 0    Glucose Blood (ONETOUCH ULTRA) In Vitro Strip 1 each by Other route 2 (two) times daily. - Other 200 strip 3    ONETOUCH LANCETS Does not apply Misc 1 each by Finger stick route 2 (two) times daily. 200 each 1           Past Medical History:   Diagnosis Date    DIABETES      GOUT      HOSPITALIZATIONS 11/01/1998     SPLENIC INFARCT    HOSPITALIZATIONS 01/01/1988     PNEUMONIA    HYPERLIPIDEMIA      HYPERTENSION      OBESITY      BIBIANA (obstructive sleep apnea) 09/06/2022     HST AHI-14    OTHER DISEASES 11/01/2003     Pneumovax             Past Surgical History:   Procedure Laterality Date    COLONOSCOPY   9-05     Hyperplastic polyps- F/U 2013    EXCISION MALIGNANT LESION TRUNK/ARM/LEG > 4.0 CM   9/21/09     Performed by NIXON SOFIA at [facility] [place] [facility] [place], [employer]    OTHER SURGICAL HISTORY   ~1992     L TROTTER'S NEUROMA    SPLT AGRFT T/A/L 1ST 100 SQCM/</1% BDY INFT/CHLD   9/21/09     Performed by NIXON SOFIA at [facility]    VASECTOMY   1986         Social History    Tobacco Use      Smoking status: Never      Smokeless tobacco: Never    Vaping Use      Vaping status: Never Used    Alcohol use: Yes      Comment: 6 BEERS / WK    Drug use: No     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 MI    Diabetes Father      Heart Disorder Mother      Diabetes Mother      Diabetes Brother           REVIEW OF SYSTEMS:  GENERAL: feels well otherwise  SKIN: denies any unusual skin lesions  EYES: denies blurred vision or double vision  HEENT: denies nasal  congestion, sinus pain or ST  LUNGS: denies shortness of breath with exertion  CARDIOVASCULAR: denies chest pain on exertion  GI: as above  : denies nocturia or changes in stream  MUSCULOSKELETAL: denies back pain  NEURO: denies headaches  PSYCHE: denies depression or anxiety  HEMATOLOGIC: denies hx of anemia  ENDOCRINE: denies thyroid history  ALL/ASTHMA: denies hx of allergy or asthma     EXAM:  There were no vitals taken for this visit.  GENERAL: well developed, well nourished, in no apparent distress  SKIN: no rashes, no suspicious lesions  HEENT: atraumatic, normocephalic, ears and throat are clear  NECK: supple, no adenopathy, no bruits  CHEST: no chest tenderness  LUNGS: clear to auscultation  CARDIO: RRR without murmur  GI: good BS's and no masses, HSM or tenderness  RECTAL: Exam not done.  MUSCULOSKELETAL: back is not tender,FROM of the back  EXTREMITIES: no cyanosis, clubbing or edema     Component      Latest Ref Rng 5/24/2025 5/27/2025   WBC      4.00 - 13.00 10^3/uL 6.24      RBC      3.80 - 5.80 10^6/uL 4.40      Hemoglobin      13.0 - 17.0 g/dL 12.9 (L)      Hematocrit      37.0 - 53.0 % 38.0      MCV      80.0 - 99.0 fL 86.4      MCH      27.0 - 33.2 pg 29.3      MCHC      31.0 - 37.0 g/dL 33.9      Platelet Count      150 - 450 10^3/uL 311      RDW      11.5 - 16.0 % 15.4      MEAN PLATELET VOLUME      7.0 - 11.5 fL 9.7      Neutrophils Absolute      1.30 - 6.70 10ˆ3/µL 3.48      Lymphocytes Absolute      0.90 - 4.00 10ˆ3/µL 1.54      Monocytes Absolute      0.10 - 1.00 10ˆ3/µL 0.92      Eosinophils Absolute      0.00 - 0.30 10ˆ3/µL 0.22      Basophils Absolute      0.00 - 0.10 10ˆ3/µL 0.08      nRBC Absolute      0.000 - 0.012 10ˆ3/µL 0.000      Neutrophils %      % 55.8      Lymphocytes %      % 24.7      Monocytes %      % 14.7      Eosinophils %      % 3.5      Basophils %      % 1.3      nRBC/100 WBC      % 0.00      Patient Fasting? Yes      Patient Fasting? Yes      Glucose      74 - 109  mg/dL 106      BUN      6.0 - 20.0 mg/dL 17.0      CREATININE      0.70 - 1.20 mg/dL 0.98      Sodium      136 - 145 mmol/L 139      Potassium      3.5 - 5.2 mmol/L 5.3 (H)  4.7    Chloride      98 - 107 mmol/L 102      Carbon Dioxide, Total      22.0 - 29.0 mmol/L 25.0      CALCIUM      8.6 - 10.3 mg/dL 9.4      Corrected Calcium      8.3 - 10.3 mg/dL 9.8      PROTEIN, TOTAL      6.4 - 8.3 g/dL 6.8      Albumin      3.5 - 5.2 g/dL 3.5      Bilirubin, Total      0.00 - 1.20 mg/dL 0.33      ALKALINE PHOSPHATASE      45 - 117 U/L 90      AST (SGOT)      0 - 40 U/L 15      ALT (SGPT)      0 - 41 U/L 10      GFR CKD-EPI      >=60.0 mL/min/1.73 square meters 83.0      Triglycerides      <=150 mg/dL 71      HDL Cholesterol      >=40 mg/dL 47      Risk Factor      <5.0  2.3      Cholesterol      <=200 mg/dL 108      LDL Cholesterol Calc      <=130 mg/dL 47      VLDL Cholesterol Adán      <=30 mg/dL 14      Iron, Serum      59 - 158 µg/dL   48 (L)    Iron Bind.Cap.(TIBC)      298 - 536 µg/dL   321    Saturation Percent      13 - 45 %   15    UIBC      50 - 180 µg/dL   273 (H)    MALB URINE      0.0 - 17.0 mg/dL <1.2      CREATININE UR RANDOM      39.00 - 259.00 mg/dL 61.27      MALB/CRE CALC      <30 mg albumin/g creatinine  <19.6      HEMOGLOBIN A1C      4.0 - 5.6 %   6.9 (H)    ESTIMATED AVERAGE GLUCOSE      mg/dL   151    TSH      0.270 - 4.200 mIU/L 2.620      PSA      0.01 - 4 ng/mL 1.29      FERRITIN      30.0 - 400.0 ng/mL   29.5 (L)    Vitamin B12      211 - 946 pg/mL   250       Component      Latest Ref Rng 11/23/2019 4/3/2021 4/8/2023 12/13/2023 4/6/2024 5/24/2025   WBC      4.00 - 13.00 10^3/uL 6.65  7.08  8.84  8.19  7.11  6.24    RBC      3.80 - 5.80 10^6/uL 4.54  4.78  4.59  4.76  4.40  4.40    Hemoglobin      13.0 - 17.0 g/dL 14.0  14.9  14.0  14.1  13.5  12.9 (L)    Hematocrit      37.0 - 53.0 % 43.5  43.6  41.3  43.0  39.6  38.0    MCV      80.0 - 99.0 fL 95.8  91.2  90.0  90.3  90.0  86.4    MCH      27.0  - 33.2 pg 30.8  31.2  30.5  29.6  30.7  29.3    MCHC      31.0 - 37.0 g/dL 32.2  34.2  33.9  32.8  34.1  33.9    Platelet Count      150 - 450 10^3/uL 320  303  356  365  338  311          Assessment:    Iron deficiency anemia  Upper abdominal pain                The patient presents with iron deficiency anemia of unclear etiology.  Differential diagnosis includes iron malabsorption vs occult gastrointestinal blood loss from the upper or lower GI tract.  Evaluation will be performed to rule out celiac disease, colorectal / gastroesophageal cancer, peptic ulcer disease, esophogitis, AVMs of the upper or lower GI tract.  The most likely cause of the anemia is unclear.                Pt is on xarelto and ozempic     Plan:  1. Colonoscopy and EGD with [facility] as pt is an ASA 3 will be scheduled at the patient's earliest convenience.  Risks of bleeding and perforation  were discussed.            2. If the above work up is negative, will consider capsule endoscopy to rule out small bowel sources of GI bleeding.            3. Will contact Dr. Dupont to approve holding xarelto for 2 full days prior to the endoscopy.            4.  Hold ozempic for 1 week prior to the endoscopies.

## 2025-07-02 ENCOUNTER — HOSPITAL ENCOUNTER (OUTPATIENT)
Facility: HOSPITAL | Age: 70
Setting detail: HOSPITAL OUTPATIENT SURGERY
Discharge: HOME | End: 2025-07-02
Attending: INTERNAL MEDICINE | Admitting: INTERNAL MEDICINE
Payer: MEDICARE

## 2025-07-02 ENCOUNTER — ANESTHESIA EVENT (OUTPATIENT)
Dept: ENDOSCOPY | Facility: HOSPITAL | Age: 70
End: 2025-07-02
Payer: MEDICARE

## 2025-07-02 ENCOUNTER — ANESTHESIA (OUTPATIENT)
Dept: ENDOSCOPY | Facility: HOSPITAL | Age: 70
End: 2025-07-02
Payer: MEDICARE

## 2025-07-02 VITALS
RESPIRATION RATE: 18 BRPM | WEIGHT: 290 LBS | OXYGEN SATURATION: 100 % | TEMPERATURE: 97 F | HEART RATE: 75 BPM | DIASTOLIC BLOOD PRESSURE: 86 MMHG | HEIGHT: 69 IN | BODY MASS INDEX: 42.95 KG/M2 | SYSTOLIC BLOOD PRESSURE: 137 MMHG

## 2025-07-02 LAB — GLUCOSE BLD-MCNC: 140 MG/DL (ref 70–99)

## 2025-07-02 PROCEDURE — 82962 GLUCOSE BLOOD TEST: CPT

## 2025-07-02 PROCEDURE — 88305 TISSUE EXAM BY PATHOLOGIST: CPT | Performed by: INTERNAL MEDICINE

## 2025-07-02 RX ORDER — SODIUM CHLORIDE, SODIUM LACTATE, POTASSIUM CHLORIDE, CALCIUM CHLORIDE 600; 310; 30; 20 MG/100ML; MG/100ML; MG/100ML; MG/100ML
INJECTION, SOLUTION INTRAVENOUS CONTINUOUS
Status: DISCONTINUED | OUTPATIENT
Start: 2025-07-02 | End: 2025-07-02

## 2025-07-02 RX ORDER — NICOTINE POLACRILEX 4 MG
30 LOZENGE BUCCAL
Status: DISCONTINUED | OUTPATIENT
Start: 2025-07-02 | End: 2025-07-02

## 2025-07-02 RX ORDER — DEXTROSE MONOHYDRATE 25 G/50ML
50 INJECTION, SOLUTION INTRAVENOUS
Status: DISCONTINUED | OUTPATIENT
Start: 2025-07-02 | End: 2025-07-02

## 2025-07-02 RX ORDER — LIDOCAINE HYDROCHLORIDE 10 MG/ML
INJECTION, SOLUTION EPIDURAL; INFILTRATION; INTRACAUDAL; PERINEURAL AS NEEDED
Status: DISCONTINUED | OUTPATIENT
Start: 2025-07-02 | End: 2025-07-02 | Stop reason: SURG

## 2025-07-02 RX ORDER — NICOTINE POLACRILEX 4 MG
15 LOZENGE BUCCAL
Status: DISCONTINUED | OUTPATIENT
Start: 2025-07-02 | End: 2025-07-02

## 2025-07-02 RX ORDER — NALOXONE HYDROCHLORIDE 0.4 MG/ML
0.08 INJECTION, SOLUTION INTRAMUSCULAR; INTRAVENOUS; SUBCUTANEOUS ONCE AS NEEDED
Status: DISCONTINUED | OUTPATIENT
Start: 2025-07-02 | End: 2025-07-02

## 2025-07-02 RX ORDER — ONDANSETRON 2 MG/ML
4 INJECTION INTRAMUSCULAR; INTRAVENOUS ONCE AS NEEDED
Status: DISCONTINUED | OUTPATIENT
Start: 2025-07-02 | End: 2025-07-02

## 2025-07-02 RX ADMIN — SODIUM CHLORIDE, SODIUM LACTATE, POTASSIUM CHLORIDE, CALCIUM CHLORIDE: 600; 310; 30; 20 INJECTION, SOLUTION INTRAVENOUS at 07:34:00

## 2025-07-02 RX ADMIN — SODIUM CHLORIDE, SODIUM LACTATE, POTASSIUM CHLORIDE, CALCIUM CHLORIDE: 600; 310; 30; 20 INJECTION, SOLUTION INTRAVENOUS at 08:15:00

## 2025-07-02 RX ADMIN — LIDOCAINE HYDROCHLORIDE 25 MG: 10 INJECTION, SOLUTION EPIDURAL; INFILTRATION; INTRACAUDAL; PERINEURAL at 07:38:00

## 2025-07-02 NOTE — ANESTHESIA PREPROCEDURE EVALUATION
PRE-OP EVALUATION    Patient Name: Benjamin Duffy    Admit Diagnosis: IRON DEFICIENCY ANESMIA UNSPECIFIED IRON DEFICIENCY ANEMIA TYPE EPIGASTRIC PAIN    Pre-op Diagnosis: IRON DEFICIENCY ANESMIA UNSPECIFIED IRON DEFICIENCY ANEMIA TYPE EPIGASTRIC PAIN    ESOPHAGOGASTRODUODENOSCOPY (EGD), COLONOSCOPY    Anesthesia Procedure: ESOPHAGOGASTRODUODENOSCOPY (EGD), COLONOSCOPY  COLONOSCOPY    Surgeons and Role:     * Ramirez Higuera MD - Primary    Pre-op vitals reviewed.  Temp: 96.7 °F (35.9 °C)  Pulse: 100  Resp: 18  BP: 166/88  SpO2: 94 %  Body mass index is 42.83 kg/m².    Current medications reviewed.  Hospital Medications:  Current Medications[1]    Outpatient Medications:   Prescriptions Prior to Admission[2]    Allergies: Cerave itch relief [pramoxine hcl]      Anesthesia Evaluation  Problem List[3]     Past Medical History[4]       Past Surgical History[5]  Social Hx on file[6]  History   Drug Use No     Available pre-op labs reviewed.               Airway      Mallampati: III  Mouth opening: >3 FB  TM distance: 4 - 6 cm  Neck ROM: full Cardiovascular      Rhythm: regular  Rate: normal     Dental    Dentition appears grossly intact         Pulmonary    Pulmonary exam normal.  Breath sounds clear to auscultation bilaterally.               Other findings              ASA: 3   Plan: MAC  NPO status verified and     Post-procedure pain management plan discussed with surgeon and patient.    Comment: Plan is MAC anesthesia, which likely will include deep sedation.  Implied that memory of procedure is unlikely although intraop recall, if it occurs, may be a reasonable and comfortable experience with this anesthetic.  Aware that general anesthesia is not intended though deep sedation may include brief moments of general anesthesia.   Questions answered. Accepts. The consent was signed without further questions.   Plan/risks discussed with: patient  Use of blood product(s) discussed with: patient    Consented to blood  products.          Present on Admission:  **None**             [1]    glucose (Dex4) 15 GM/59ML oral liquid 15 g  15 g Oral Q15 Min PRN    Or    glucose (Glutose) 40% oral gel 15 g  15 g Oral Q15 Min PRN    Or    glucose-vitamin C (Dex-4) chewable tab 4 tablet  4 tablet Oral Q15 Min PRN    Or    dextrose 50% injection 50 mL  50 mL Intravenous Q15 Min PRN    Or    glucose (Dex4) 15 GM/59ML oral liquid 30 g  30 g Oral Q15 Min PRN    Or    glucose (Glutose) 40% oral gel 30 g  30 g Oral Q15 Min PRN    Or    glucose-vitamin C (Dex-4) chewable tab 8 tablet  8 tablet Oral Q15 Min PRN    lactated ringers infusion   Intravenous Continuous   [2]   Medications Prior to Admission   Medication Sig Dispense Refill Last Dose/Taking    Ferrous Sulfate 325 (65 Fe) MG Oral Tab Take 1 tablet (325 mg total) by mouth daily with breakfast.   6/22/2025    insulin glargine (LANTUS SOLOSTAR) 100 UNIT/ML Subcutaneous Solution Pen-injector Inject 35 Units into the skin daily.   7/1/2025    pantoprazole 40 MG Oral Tab EC Take 1 tablet (40 mg total) by mouth before breakfast.   7/1/2025    semaglutide (OZEMPIC, 2 MG/DOSE,) 8 MG/3ML Subcutaneous Solution Pen-injector Inject 2 mg into the skin once a week. INJECTIONS ON SATURDAYS 6/22/2025    rivaroxaban (XARELTO) 20 MG Oral Tab Take 1 tablet (20 mg total) by mouth daily with food. 90 tablet 2 6/29/2025    pioglitazone 45 MG Oral Tab Take 1 tablet (45 mg total) by mouth daily. 30 tablet 1 7/1/2025    metFORMIN HCl 1000 MG Oral Tab Take 1 tablet (1,000 mg total) by mouth 2 (two) times daily with meals. 60 tablet 1 7/1/2025    Irbesartan 150 MG Oral Tab Take 1 tablet (150 mg total) by mouth daily. 30 tablet 1 7/1/2025    hydroCHLOROthiazide 12.5 MG Oral Cap Take 1 capsule (12.5 mg total) by mouth once daily. 30 capsule 1 7/1/2025    glimepiride 4 MG Oral Tab Take 2 tablets (8 mg total) by mouth daily. 60 tablet 1 7/1/2025    atorvastatin (LIPITOR) 40 MG Oral Tab Take 1 tablet (40 mg total) by  mouth daily. 30 tablet 1 7/1/2025    Insulin Pen Needle (BD PEN NEEDLE SHORT U/F) 31G X 8 MM Does not apply Misc Use once daily with insulin pen; 100 needles per box please 100 each 3 Unknown    Glucose Blood (ONETOUCH TEST) In Vitro Strip 1 each by Other route 2 (two) times daily. 200 each 1 Unknown    ONETOUCH LANCETS Does not apply Misc 1 each by Finger stick route 2 (two) times daily. 200 each 1 Unknown   [3]   Patient Active Problem List  Diagnosis    Uncontrolled type 2 diabetes mellitus with hyperglycemia (HCC)    Essential hypertension, benign    Mixed hyperlipidemia    Personal history of colonic polyps    Personal history of skin cancer    Scar condition and fibrosis of skin    Erectile dysfunction    SNHL (sensory-neural hearing loss), asymmetrical    History of DVT of lower extremity    Post-phlebitic syndrome    Deep venous insufficiency    Morbid obesity due to excess calories (HCC)    Aortic atherosclerosis    Nocturia    Daytime somnolence    Snoring   [4]   Past Medical History:   Deaf    100 % DEAF LEFT EAR    Deep vein thrombosis (HCC)    DIABETES    Diabetes (HCC)    Esophageal reflux    GOUT    Hearing impaired person, bilateral    DEAF IN LEFT EAR, HEARING LOSS IN RIGHT EAR    High blood pressure    High cholesterol    HOSPITALIZATIONS    SPLENIC INFARCT    HOSPITALIZATIONS    PNEUMONIA    HYPERLIPIDEMIA    HYPERTENSION    IBS (irritable bowel syndrome)    Muscle weakness    USES A CANE TO AMBULATE    OBESITY    Osteoarthritis    SHOULDER/KNEES    OTHER DISEASES    Pneumovax     Pulmonary embolism (HCC)    Shortness of breath    WITH EXERTION    Sleep apnea    NO MACHINE/TREATMENT    Visual impairment    GLASSES   [5]   Past Surgical History:  Procedure Laterality Date    Colonoscopy  9-05    Hyperplastic polyps- F/U 2013    Exc skin malig >4cm trunk,arm,leg  9/21/09    Performed by NIXON SOFIA at [facility]    Other surgical history  ~1992    L TROTTER'S NEUROMA    Split grft  proc trunk <100sqcm  9/21/09    Performed by NIXON SOFIA at [facility] [place] [facility] [place], [facility]    Vasectomy  1986   [6]   Social History  Socioeconomic History    Marital status:    Tobacco Use    Smoking status: Never    Smokeless tobacco: Never   Vaping Use    Vaping status: Never Used   Substance and Sexual Activity    Alcohol use: Not Currently     Comment: 6 BEERS / WK    Drug use: No

## 2025-07-02 NOTE — ANESTHESIA POSTPROCEDURE EVALUATION
[facility]    Benjamin Duffy Patient Status:  Hospital Outpatient Surgery   Age/Gender 70 year old male MRN [id number]   Location [facility] Attending Ramirez Higuera MD   Hosp Day # 0 PCP Dougie Dupont MD       Anesthesia Post-op Note    ESOPHAGOGASTRODUODENOSCOPY (EGD) with biopsies, COLONOSCOPY with cold snare polypectomy    Procedure Summary       Date: 07/02/25 Room / Location:  ENDOSCOPY 03 / [facility]    Anesthesia Start: 0734 Anesthesia Stop:     Procedures:       ESOPHAGOGASTRODUODENOSCOPY (EGD) with biopsies, COLONOSCOPY with cold snare polypectomy      COLONOSCOPY Diagnosis: (Hiatal hernia, polyp, diverticulosis)    Surgeons: Ramirez Higuera MD Anesthesiologist: Damon Cruz MD    Anesthesia Type: MAC ASA Status: 3            Anesthesia Type: MAC    Vitals Value Taken Time   /89 07/02/25 08:17   Temp  07/02/25 08:17   Pulse 89 07/02/25 08:17   Resp 16 07/02/25 08:17   SpO2 99 07/02/25 08:17           Patient Location: Endoscopy    Anesthesia Type: MAC    Airway Patency: patent    Postop Pain Control: adequate    Mental Status: preanesthetic baseline    Nausea/Vomiting: none    Cardiopulmonary/Hydration status: stable euvolemic    Complications: no apparent anesthesia related complications    Postop vital signs: stable    Dental Exam: Unchanged from Preop    Patient to be discharged from PACU when criteria met.

## 2025-07-02 NOTE — DISCHARGE INSTRUCTIONS
ENDOSCOPY DISCHARGE INSTRUCTIONS    Procedure Performed:   Gastroscopy, Colonoscopy, and Polypectomy  Endoscopist: No name on file  FINDINGS:   Hiatal hernia (stomach slides up into chest through diaphragm), Diverticulosis (pockets in colon that develop with age and lack of fiber intake), and Colon polyp(s) (a growth in the colon)    MEDICATIONS:  You may resume all other medications today    DIET:  General    BIOPSIES:  Biopsies were taken (you will be notified of results in 7-10 days)      ADDITIONAL RECOMMENDATIONS:  Resume xarelto tomorrow 7/3.   A video capsule small bowel endoscopy will be scheduled to assess for a small bowel source of bleeding.  Recommendations regarding repeat colonoscopy will be made once the biopsy results are reviewed.    Follow up in the office in 4 - 6 weeks.     Activity for remainder of today:    REST TODAY  DO NOT drive or operate heavy machinery  DO NOT drink any alcoholic beverages  DO NOT sign any legal documents or make any important decisions    After your procedure(s):  It is not unusual to feel bloated or gassy .  Passing gas and belching is encouraged. Lying on your left side with your knees flexed may relieve the discomfort. A hot pack to the abdomen may also help.    After your gastroscopy (upper endoscopy): You may experience a slight sore throat which will subside. Throat lozenges or salt water gargle can be used.    FOLLOW-UP:  Contact the office at [phone] for follow-up appointment if needed or if you develop any of the following:    Severe abdominal pain/discomfort       Excessive bleeding or black tarry stool  Difficulty breathing or swallowing        Persistent nausea,vomiting, or a fever above 100 degrees or chills     Home Care Instructions for Colonoscopy and/or Gastroscopy with Sedation    Diet:  - Resume your regular diet as tolerated unless otherwise instructed.  - Start with light meals to minimize bloating.  - Do not drink alcohol  today.    Medication:  - If you have questions about resuming your normal medications, please contact your Primary Care Physician.    Activities:  - Take it easy today. Do not return to work today.  - Do not drive today.  - Do not operate any machinery today (including kitchen equipment).    Colonoscopy:  - You may notice some rectal \"spotting\" (a little blood on the toilet tissue) for a day or two after the exam. This is normal.  - If you experience any rectal bleeding (not spotting), persistent tenderness or sharp severe abdominal pains, oral temperature over 100 degrees Fahrenheit, light-headedness or dizziness, or any other problems, contact your doctor.    Gastroscopy:  - You may have a sore throat for 2-3 days following the exam. This is normal. Gargling with warm salt water (1/2 tsp salt to 1 glass warm water) or using throat lozenges will help.  - If you experience any sharp pain in your neck, abdomen or chest, vomiting of blood, oral temperature over 100 degrees Fahrenheit, light-headedness or dizziness, or any other problems, contact your doctor.    **If unable to reach your doctor, please go to the [facility] Emergency Room**    - Your referring physician will receive a full report of your examination.  - If you do not hear from your doctor's office within two weeks of your biopsy, please call them for your results.    You may be able to see your laboratory results in [facility] between 4 and 7 business days.  In some cases, your physician may not have viewed the results before they are released to [facility].  If you have questions regarding your results contact the physician who ordered the test/exam by phone or via [facility] by choosing \"Ask a Medical Question.\"

## 2025-07-02 NOTE — BRIEF OP NOTE
Pre-Operative Diagnosis: IRON DEFICIENCY ANESMIA UNSPECIFIED IRON DEFICIENCY ANEMIA TYPE EPIGASTRIC PAIN     Post-Operative Diagnosis: Hiatal hernia, polyp, diverticulosis      Procedure Performed:   ESOPHAGOGASTRODUODENOSCOPY (EGD) with biopsies, COLONOSCOPY with cold snare polypectomy    Surgeons and Role:     * Ramirez Higuera MD - Primary    Assistant(s):        Surgical Findings: see above     Specimen: see op note     Estimated Blood Loss: No data recorded    Dictation Number:  none    Ramirez Higuera MD  7/2/2025  8:18 AM

## (undated) DEVICE — BITEBLOCK ENDOSCP 60FR MAXI STRP

## (undated) DEVICE — 10FT COMBINED O2 DELIVERY/CO2 MONITORING. FILTER WITH MICROSTREAM TYPE LUER: Brand: DUAL ADULT NASAL CANNULA

## (undated) DEVICE — KIT CUSTOM ENDOPROCEDURE STERIS

## (undated) DEVICE — KIT VLV 5 PC AIR H2O SUCT BX ENDOGATOR CONN

## (undated) DEVICE — GIJAW SINGLE-USE BIOPSY FORCEPS WITH NEEDLE: Brand: GIJAW

## (undated) DEVICE — 3M™ RED DOT™ MONITORING ELECTRODE WITH FOAM TAPE AND STICKY GEL, 50/BAG, 20/CASE, 72/PLT 2570: Brand: RED DOT™

## (undated) DEVICE — V2 SPECIMEN COLLECTION MANIFOLD KIT: Brand: NEPTUNE

## (undated) DEVICE — 1200CC GUARDIAN II: Brand: GUARDIAN

## (undated) DEVICE — LASSO POLYPECTOMY SNARE: Brand: LASSO